# Patient Record
Sex: FEMALE | URBAN - METROPOLITAN AREA
[De-identification: names, ages, dates, MRNs, and addresses within clinical notes are randomized per-mention and may not be internally consistent; named-entity substitution may affect disease eponyms.]

---

## 2023-12-01 ENCOUNTER — TELEPHONE (OUTPATIENT)
Dept: UROLOGY | Facility: CLINIC | Age: 78
End: 2023-12-01

## 2023-12-01 NOTE — TELEPHONE ENCOUNTER
Contacted patient from [facility] to schedule for NP appointment on 12/5. Patient accepted and confirmed new date and time of NP appt. Patient was provided with NP instructions and expressed understanding to bring ID, insurance card(s), and NP paperwork. Patient was also provided with clinic address.

## 2023-12-05 ENCOUNTER — OFFICE VISIT (OUTPATIENT)
Dept: UROLOGY | Facility: CLINIC | Age: 78
End: 2023-12-05
Attending: OBSTETRICS & GYNECOLOGY
Payer: MEDICARE

## 2023-12-05 VITALS
WEIGHT: 135.19 LBS | BODY MASS INDEX: 26.54 KG/M2 | RESPIRATION RATE: 16 BRPM | TEMPERATURE: 98 F | HEIGHT: 60 IN | SYSTOLIC BLOOD PRESSURE: 128 MMHG | DIASTOLIC BLOOD PRESSURE: 58 MMHG

## 2023-12-05 DIAGNOSIS — N39.0 FREQUENT UTI: ICD-10-CM

## 2023-12-05 DIAGNOSIS — N81.2 UTEROVAGINAL PROLAPSE, INCOMPLETE: Primary | ICD-10-CM

## 2023-12-05 DIAGNOSIS — R35.1 NOCTURIA: ICD-10-CM

## 2023-12-05 DIAGNOSIS — N81.84 PELVIC MUSCLE WASTING: ICD-10-CM

## 2023-12-05 DIAGNOSIS — N95.2 POSTMENOPAUSAL ATROPHIC VAGINITIS: ICD-10-CM

## 2023-12-05 LAB
BLOOD URINE: NEGATIVE
CONTROL RUN WITHIN 24 HOURS?: YES
LEUKOCYTE ESTERASE URINE: NEGATIVE
NITRITE URINE: NEGATIVE

## 2023-12-05 PROCEDURE — 81002 URINALYSIS NONAUTO W/O SCOPE: CPT | Performed by: OBSTETRICS & GYNECOLOGY

## 2023-12-05 PROCEDURE — 87086 URINE CULTURE/COLONY COUNT: CPT | Performed by: OBSTETRICS & GYNECOLOGY

## 2023-12-05 PROCEDURE — 99212 OFFICE O/P EST SF 10 MIN: CPT

## 2023-12-05 PROCEDURE — 51701 INSERT BLADDER CATHETER: CPT | Performed by: OBSTETRICS & GYNECOLOGY

## 2023-12-05 RX ORDER — ESTRADIOL 0.1 MG/G
CREAM VAGINAL
Qty: 42.5 G | Refills: 3 | Status: SHIPPED | OUTPATIENT
Start: 2023-12-05

## 2023-12-08 ENCOUNTER — TELEPHONE (OUTPATIENT)
Dept: UROLOGY | Facility: CLINIC | Age: 78
End: 2023-12-08

## 2023-12-08 NOTE — TELEPHONE ENCOUNTER
TC from patient on nurse line to clarify use of estrace vaginal cream is 2x/week not 2x/day    Called back and LVM to use 2x/week

## 2023-12-12 ENCOUNTER — TELEPHONE (OUTPATIENT)
Dept: UROLOGY | Facility: CLINIC | Age: 78
End: 2023-12-12

## 2023-12-13 ENCOUNTER — OFFICE VISIT (OUTPATIENT)
Dept: UROLOGY | Facility: CLINIC | Age: 78
End: 2023-12-13
Attending: OBSTETRICS & GYNECOLOGY
Payer: MEDICARE

## 2023-12-13 VITALS
WEIGHT: 135 LBS | SYSTOLIC BLOOD PRESSURE: 124 MMHG | BODY MASS INDEX: 26 KG/M2 | RESPIRATION RATE: 16 BRPM | DIASTOLIC BLOOD PRESSURE: 72 MMHG

## 2023-12-13 DIAGNOSIS — N81.2 UTEROVAGINAL PROLAPSE, INCOMPLETE: Primary | ICD-10-CM

## 2023-12-13 LAB
CONTROL RUN WITHIN 24 HOURS?: YES
LEUKOCYTE ESTERASE URINE: NEGATIVE
NITRITE URINE: NEGATIVE

## 2023-12-13 PROCEDURE — 51797 INTRAABDOMINAL PRESSURE TEST: CPT

## 2023-12-13 PROCEDURE — 81002 URINALYSIS NONAUTO W/O SCOPE: CPT

## 2023-12-13 PROCEDURE — 51741 ELECTRO-UROFLOWMETRY FIRST: CPT

## 2023-12-13 PROCEDURE — 51729 CYSTOMETROGRAM W/VP&UP: CPT

## 2023-12-13 PROCEDURE — 51784 ANAL/URINARY MUSCLE STUDY: CPT

## 2023-12-13 RX ORDER — RAMIPRIL 5 MG/1
5 CAPSULE ORAL DAILY
COMMUNITY

## 2023-12-13 NOTE — PROCEDURES
Patient here for urodynamic testing. Procedure explained and confirmed by patient. See evaluation form for results. Both verbal and written discharge instructions were given. Patient tolerated procedure well and will follow up with Dr. Dyana Weaver on 2024. URODYNAMIC EVALUATION    PATIENT HISTORY:    Prolapse:  Yes ( does not have sense of bulge)  NATHAN:  No  UUI:  No  Nocturia:  2 to 3 - varies  Frequency:  >3 hours  Sense of Incomplete Emptying:  No  Constipation:  No  Last void prior to UDS testin minutes  Current urge to void? Moderate  OAB meds stopped prior to test?  NA  Other symptoms? Reports pressure in lower pelvis at times - hx of UTI symptoms  [facility]ed [facility] following visit on 23 - reviewed finger tip application    Surgery? [x]  No  []  Interested in surgery:   []  Yes, specify date:    Surgical folder provided? []  Yes  [x]  No     PATIENT DIAGNOSIS:  Uterovaginal Prolapse N81.2 (incomplete)    UDS PROCEDURAL FINDINGS:  Urethral Hypermobility N36.41    MEDICATION:   Outpatient Medications Marked as Taking for the 23 encounter (Office Visit) with LIS PROCEDURE   Medication Sig Dispense Refill    Stress Formula/Zinc Oral Tab Take 1 tablet by mouth daily. ramipril 5 MG Oral Cap Take 1 capsule (5 mg total) by mouth daily. estradiol (ESTRACE) 0.1 MG/GM Vaginal Cream Apply 1/2 gram vaginally 2 nights a week 42.5 g 3    CRANBERRY OR Take by mouth. ALLERGIES:  Patient has no known allergies. EXAM:  Urinalysis Dip:  Today's Results   Component Date Value    control run 2023 Yes     Blood Urine 2023 Trace (A)     Nitrite Urine 2023 Negative     Leukocyte esterase urine 2023 Negative       Urovesico [place]unction ( >30 degrees ):  [x]  Mobile  []  Fixed    Perineal Sensation:  [x]  Normal  []  Abnormal    Additional Notes: Does Kegels on regular basis, although is unsure if she completely relaxes contraction.     PROLAPSE:  [x]  Yes []  No  Prolapse reduced for testing? [x]  Yes  []  No  []  Pessary  []  Manual  [x]  Half Speculum    Additional Notes:    UROFLOWMETRY:  Unreduced  Voided Volume:                    219         mL  Maximum Flow Rate:                        14        mL/sec  Average flow rate:                    7         mL/sec  Post-void Residual:                    40       mL  Pattern:  [x]  Normal  []  Poor flow     []  Intermittent  []  Other  Void:   [x]  Typical  []  Atypical    Additional Notes:    CYSTOMETRY:  Urethral Catheter:  Fr 7 / tdoc  Abd Catheter:     Fr 7 / tdoc   Infusion:  Water Rate 30 mL/min  Temp:  Room  Position:  [x]  Sit  []  Stand  []  Supine  First sensation:   121 mL  First desire to void:   200 mL  Strong desire to void:  276 mL  Maximum cystometric capacity:   300 mL  Detrusor Activity:  []  Unstable   [x]  Stable  Urge leakage? []  Yes [x]  No  Volume at 1st unhibited detrusor cont:    mL  Detrusor instability provoked by:    []  Spontaneous []  Coughing  []  Filling  []  Valsalva  []  Other    Additional Notes:      URETHRAL FUNCTION:  Valsava (vesical) Leak Point Pressures:    Volume Leak Point Pressure Leak? Cough Valsalva      100mL 77 44    cm H2O []  Yes [x]  No         REDUCED: ( half spec)  Volume Leak Point Pressure Leak? Cough Valsalva      100mL 77 43    cm H2O []  Yes [x]  No   200mL 88 79    cm H2O []  Yes [x]  No   275 mL 104 65    cm H2O []  Yes [x]  No       Genuine Stress Incontinence demonstrated?    []  Yes  [x]  No    Resting Urethral Pressure Profile:     Functional Urethral Length:         0.6 cm         0.6        cm                 cm  Maximum UCP:           63 cm             74 cm                  cm    PRESSURE/FLOW STUDY:  Unreduced  Voided volume:      423 + 50 ml BR = 473   mL  Maximum flow rate:      19  mL/sec     Pressure Detrusor (at maximum flow):     13 ( pves removed)       cm H2O  Post void residual:     22 ( bladder scan)          mL  Voiding mechanism:  []  Abnormal  [x]  Normal  []  Strain to void   []  Weak detrusor  Void:   []  Typical   [x]  Atypical    Additional Notes: Unable to empty with Pves in place - volume of 423 ml and up to BR for additional 50 ml only   Uroflowmetry, cystometry, and pressure / flow study were completed using calibrated electronic equipment and air charged transducers.     EMG:  During fill: Reactive    During flow study: Reactive    12/13/2023 11:44 AM     PERFORMED BY:  Colin Rodriguez RN      URODYNAMIC PHYSICIAN INTERPRETATION    IMPRESSION:   219/40cc & 473/22cc  FDC 300cc  No DO  No NATHAN  Post-Procedure Diagnoses: Urethral hypermobility [N36.41]    Comments:      Nichol Trujillo,    12/13/2023   3:28 PM

## 2023-12-13 NOTE — PATIENT INSTRUCTIONS
[facility] UROGYNECOLOGY  [address]   [place] [address]  [place]: [phone]  FAX: [phone]       Urodynamic Testing Discharge Instructions: There are NO dietary or activity restrictions. You may resume your normal schedule. You may have mild discomfort for a few hours after your testing today. There may be some mild burning when you urinate or you may see some blood in your urine. These problems should not last more than 24 hours. The following suggestions may minimize any symptoms you experience. Drink 6-8 large glasses of water over the next 8 hours  A compress or sitz bath may be soothing  Tylenol or Ibuprofen may be taken as needed    If you experience any of the following, please call the office or, if after hours, the on-call physician at [phone]. Excessive pain  Bright red bloody discharge  Fever or chills  Continued urgency, frequency or burning with urination    Obtaining Test Results    Your urodynamic test will be interpreted by a specialist and available to the referring physician within 7-14 days. Patients in our clinic are given an appointment to come back to discuss the results and any appropriate treatment recommendations. Please do not hesitate to contact our office with any questions or concerns at [phone]. I acknowledge that I have received verbal and written discharge  instructions and that I understand these instructions clearly.     Patient Signature:    Date:

## 2024-01-09 ENCOUNTER — VIRTUAL PHONE E/M (OUTPATIENT)
Dept: UROLOGY | Facility: CLINIC | Age: 79
End: 2024-01-09
Attending: OBSTETRICS & GYNECOLOGY
Payer: MEDICARE

## 2024-01-09 ENCOUNTER — TELEPHONE (OUTPATIENT)
Dept: UROLOGY | Facility: CLINIC | Age: 79
End: 2024-01-09

## 2024-01-09 DIAGNOSIS — N81.2 UTEROVAGINAL PROLAPSE, INCOMPLETE: Primary | ICD-10-CM

## 2024-01-09 DIAGNOSIS — N81.84 PELVIC MUSCLE WASTING: ICD-10-CM

## 2024-01-09 DIAGNOSIS — R35.1 NOCTURIA: ICD-10-CM

## 2024-01-09 DIAGNOSIS — N95.2 POSTMENOPAUSAL ATROPHIC VAGINITIS: ICD-10-CM

## 2024-01-09 NOTE — PROGRESS NOTES
Pt presents w/ initial c/o pressure with urgency & urinary urgency   Urodynamic testing undergone without complication.  Results reviewed with patient via telephone  Given circumstances surrounding COVID-19 this visit is being conducted as a televisit with pt's consent.  Pt in safe, private environment for televisit, provider located in the office setting    219/40cc & 473/22cc  MCFP 300cc  No DO  No NATHAN    Discussed with patient mgmt options for bladder sx  Biggest bother is pressure with urgency & urinary urgency   Reports discomfort about once per week, no clear triggers  Self limited  Pressure or tightness  Worse w/ stress  Denies bulge sx  Vag estrogen twice weekly  No recent UTIs    Discussed dietary and behavioral modifications for mgmt of urinary symptoms  Discussed weight management and benefits of weight loss on urinary symptoms  Reviewed AUA/SUFU guidelines on mgmt of non-neurogenic OAB  Discussed pharmacologic and nonpharmacologic mgmt options of urinary symptoms - reviewed risks, benefits, alternatives, and goals of treatment  Discussed specific risks related to OAB meds including, but not limited to dry mouth, constipation, blurry vision, cognitive changes, and BP elevation.      Thorough discussion of surgical risks, benefits, and alternatives including, but not limited to bleeding/clots, infection, injury to nearby organs (urethra, bladder, ureters, bowel, blood vessels), mesh erosion, dyspareunia, de chance UUI, worsening NATHAN, recurrence, voiding dysfunction, and pain.    Discussed mgmt of vulvovaginal atrophy with vaginal estrogen cream. Reviewed associated benefits, risks, alternatives, and goals. Recommend low dose twice weekly mgmt   Cont vag estrogen twice weekly    All questions answered.  Pt will proceed PFPT, cont vag estrogen  Call with s/sx of UTI    Follow up after PFPT    Dr. Crystal Wallace

## 2024-01-09 NOTE — PATIENT INSTRUCTIONS
Name Address City Phone Contact   [facility] [address] [place] [phone]    [facility] [address] [place] [phone] [facility] [address] [place] [phone]    [facility] [address] [facility] [address] [place] [phone] Kadeem Macias   [facility] 2151 [facility] [phone] Lorena Mago [facility] [address] [facility] [phone]    [facility] 500 [facility] [phone] Shireen Mayorga    [facility] 5 [facility] [phone] [facility] 2[address] [facility] [phone] Qian Mcclain   Rehabilitation Services at [facility] [address] [facility] [address] [place] [phone] Erica [facility] 220 [facility] [address] [place] [phone]    [facility] [address] [facility] [address] [place] [phone] Velia Adams   [facility] 1455 [facility] [phone] Kathryn Cerna   [facility] [address] [phone] Lorena Murillo   [facility] 7117 [facility] [phone] [facility] 676 [facility] 950 [place] [phone]    [facility] 4001 [facility] [address] [place] [phone] Easton SANTACR[facility] Physical Therapy,  [address] [facility] [address] [place] [phone] Jeremy Chu   [facility] [address] [phone] Dayna Harrison   Rehabilitation Services at [facility] 200 [facility] [phone] Rashmi Davis    [facility] 400 [facility] [phone]  Yanira Bird   [facility] [address] [place] [address] [phone] Hollis Sanjay   [facility] [address] [place] [phone] Joellen Al   [facility] 1200 [facility] [phone] Kimmie [facility] 536 [facility] [address] [place] [phone]    [facility] 429 [facility] [phone] [facility] [place]   [facility] [address] [phone] Coral Mckeon   [facility] 528 [facility] [phone] [address] [phone] Mary Foy   Rehabilitation Services at [facility] [address] [place] [phone] Rajni [facility] 854 [facility] [address] Darwin Schumacher [phone]    [facility] [address] Darwin Schumacher [phone] Alma Rodríguez   [facility]habilitation [facility] at [facility] [address] [phone] Maggie Kim   [facility] [address] [place] [address] [address] [place] [phone]    [facility] Therapy [address] Venkat Granados [phone] Dayna [facility] 40 [facility] [address] [phone]    [facility] [address] [facility] [phone] Joellen Al   [facility] 3080 [facility] [phone] Malu [facility] [address] [place] [phone]    [facility] Services [address] [facility] [address] [place] [phone] Malu Jiménez   [facility] [address] [place]  [place] [phone] Vicky Thomson   [facility] [address] [facility] [phone]    [facility] 430 [facility]. [address] [place] [phone]    [facility] Physical Therapy [place] 1019 [facility] [phone] Griselda Yady Michaud [facility] [address] [place] [phone] Mindy Michaud   [facility] [address] [place] [phone] [facility] Physical Therapy [facility] [address] E [facility] [phone] Camelia Aguila   [facility] 2940 [facility] [address] [place] [phone] Thi Lynn   [facility] Therapy 640 [facility] [address] [place] [phone]    [facility] Therapy [address] [place] [phone]    [facility] [place] [address] [place] [phone]    [facility]: [place] [address] [facility] Abreu [phone]    Rehabilitation Services at [facility] 636 [facility] [address] [place] [phone] [facility] 1 [facility] [address] [place] [phone]    [facility] [address] [place] [phone] Jenni Alejandre   [facility] Therapy [address] [place] [phone] Critical access hospital Physical Therapy [address] [place] [phone] [facility] 1875 [facility] [place] [address] [place] [phone]    [facility] [address]. [facility] [phone] Monica Martinez Physical Therapy [facility] 828 [facility] [phone]    [facility] 310 [facility] [phone] Savanna Angel   [address] [phone] Jenni [facility] Physical Therapy [address] [place] [phone]    [facility] [place] [address]nd Floor [place] [phone]    [facility] 7339 [facility] [phone] [facility] Physical Therapy [facility] 301 E [facility] [phone] Sejal [facility] 102 [facility] [address] [place] [phone]    Rehabilitation Services at [facility] 544 [facility] [phone] El[facility]   Rehabilitation Services at [facility] 2900 [facility] [phone]  Iwona Wheeler   [facility] 1820 [facility] [phone] Pat Jiang   [facility] 710 [facility] [phone] Licha Escoto   [facility] 330 [facility] [phone] Verenice Landaverde   [facility] [place] 414 Division [facility] [phone] Mariya Marina   [facility] [place] 2111 [facility] [place] [phone] Deneen Antonio   [facility]'s [facility] [place] 35759 [facility] [phone] Jacqueline [facility] 30316 [facility] [phone]    [facility] [address] [facility] [phone]    [facility] [address] [place] [phone]    [facility] [address] [place] [phone] Sumaya Vero [facility] [address] [place] [phone] Jigna [facility] [address] [place] [phone]    [facility] Therapy 7 [facility] [address] [place] [phone] Adriana Portillo   [facility] [address]  Sel[address] [place] [phone] Adriana RojasLake [facility] & [facility] [place] [address] [place] [address] [phone]

## 2025-03-11 ENCOUNTER — TELEPHONE (OUTPATIENT)
Dept: UROLOGY | Facility: CLINIC | Age: 80
End: 2025-03-11

## 2025-03-21 ENCOUNTER — OFFICE VISIT (OUTPATIENT)
Dept: UROLOGY | Facility: CLINIC | Age: 80
End: 2025-03-21
Attending: OBSTETRICS & GYNECOLOGY
Payer: MEDICARE

## 2025-03-21 VITALS — WEIGHT: 135 LBS | TEMPERATURE: 98 F | BODY MASS INDEX: 26.5 KG/M2 | RESPIRATION RATE: 18 BRPM | HEIGHT: 60 IN

## 2025-03-21 DIAGNOSIS — N81.2 UTEROVAGINAL PROLAPSE, INCOMPLETE: Primary | ICD-10-CM

## 2025-03-21 DIAGNOSIS — N95.2 POSTMENOPAUSAL ATROPHIC VAGINITIS: ICD-10-CM

## 2025-03-21 DIAGNOSIS — N81.84 PELVIC MUSCLE WASTING: ICD-10-CM

## 2025-03-21 DIAGNOSIS — R39.15 URINARY URGENCY: ICD-10-CM

## 2025-03-21 PROCEDURE — 99212 OFFICE O/P EST SF 10 MIN: CPT

## 2025-03-21 RX ORDER — HYDROCHLOROTHIAZIDE 12.5 MG/1
12.5 CAPSULE ORAL
COMMUNITY
Start: 2024-06-06

## 2025-03-21 NOTE — PROGRESS NOTES
Patient presents to follow up urinary urgency  Last seen 1/24    She is currently using PFPT  Vag estrogen twice weekly    She reports 100% improvement of urinary urgency    1/24 uds  219/40cc & 473/22cc  CHCF 300cc  No DO  No NATHAN    Bowels reg  No UTIs  Denies s/sx of UTI    Reports bulge sx  Considering pessary    Temp 97.9 °F (36.6 °C)   Resp 18   Ht 60\"   BMI 26.37 kg/m²     GEN: NAD  CV: RRR  Pulm: nl effort  Abd: soft    PELVIC EXAM:  Ext. Gen: +atrophy, no lesions  Urethra: +atrophy, nontender  Bladder:+fullness, nontender  Vagina: +atrophy  Cervix: no bleeding, no lesions, nontender  Uterus: +mobile  Adnexa:no masses, nontender  Perineum: nontender  Anus: wnl  Rectum: defer     PELVIS FLOOR NEUROMUSCULAR FUNCTION:  Strength:  1 and Unable to hold greater than 3 sec  Perineal Sensation:  Normal        PELVIC SUPPORT:  [place]:  2  Ant:  3  Post:  2  CST:  negative  UVJ: +hypermobile    Impression/Plan:    ICD-10-CM    1. Uterovaginal prolapse, incomplete  N81.2       2. Postmenopausal atrophic vaginitis  N95.2       3. Pelvic muscle wasting  N81.84       4. Urinary urgency  R39.15           Discussion Items:   Nonsurgical and surgical treatments for POP  Pelvic muscle rehabilitation including pelvic floor PT  Topical estrogen therapy for treating UGA    Discussed mgmt of vulvovaginal atrophy with vaginal estrogen cream. Reviewed associated benefits, risks, alternatives, and goals. Recommend low dose twice weekly mgmt     Discussed management of pelvic organ prolapse including but not limited to behavioral modifications, conservative options, and surgical management.   Discussed pessary management including benefits and risks. Discussed importance of keeping regularly scheduled pessary checks in prevention of complications related to pessary use.     All questions answered  She understands and agrees to plan    Return for pessary trial.    Crystal Wallace, DO, FACOG, FACS    The 21st Century Cures Act makes  medical notes like these available to patients in the interest of transparency. However, be advised this is a medical document. It is intended as peer to peer communication. It is written in medical language and may contain abbreviations or verbiage that are unfamiliar. It may appear blunt or direct. Medical documents are intended to carry relevant information, facts as evident, and the clinical opinion of the practitioner.

## 2025-05-01 ENCOUNTER — OFFICE VISIT (OUTPATIENT)
Dept: UROLOGY | Facility: CLINIC | Age: 80
End: 2025-05-01
Attending: OBSTETRICS & GYNECOLOGY
Payer: MEDICARE

## 2025-05-01 VITALS — RESPIRATION RATE: 16 BRPM | TEMPERATURE: 97 F | HEIGHT: 60 IN | BODY MASS INDEX: 26 KG/M2

## 2025-05-01 DIAGNOSIS — N81.84 PELVIC MUSCLE WASTING: ICD-10-CM

## 2025-05-01 DIAGNOSIS — R35.1 NOCTURIA: ICD-10-CM

## 2025-05-01 DIAGNOSIS — N81.2 UTEROVAGINAL PROLAPSE, INCOMPLETE: Primary | ICD-10-CM

## 2025-05-01 DIAGNOSIS — N95.2 POSTMENOPAUSAL ATROPHIC VAGINITIS: ICD-10-CM

## 2025-05-01 PROCEDURE — 57160 INSERT PESSARY/OTHER DEVICE: CPT

## 2025-05-01 PROCEDURE — 99212 OFFICE O/P EST SF 10 MIN: CPT

## 2025-05-01 NOTE — PROGRESS NOTES
Pt here for pessary trial due to bulge  Wants to exhaust conservative options  Denies UI complaints  Denies NATHAN complaints  Denies UTI's  Bowels regular  Pt is using vaginal cream twice weekly   Did PFPT, doing daily pelvic exercises     She is not currently interested in surgical management of her pelvic organ prolapse.  Wants to try home care    1/24 uds  219/40cc & 473/22cc  alf 300cc  No DO  No NATHAN    Vitals:  Vitals:    05/01/25 0917   Resp: 16   Temp: 97 °F (36.1 °C)       GENERAL EXAM:  GENERAL:  NAD  HEAD: NC/AT  EYES: symmetric bilaterally. No icterus. Sclera w/o injection  NECK: no masses  LUNGS:  Normal resp effort  ABDOMEN:  Soft, no mass  MUSK: normal gait, ROM wnl. No edema  PSYCH: A&Ox3. Recent and remote memory intact    PELVIC EXAM: JENNA Swan, present for exam as a chaperone  Ext. Gen: +atrophy, no lesions  Urethra: +atrophy, nontender  Bladder:+fullness, nontender  Vagina: +atrophy  Cervix: no bleeding, no lesions, nontender  Uterus: +mobile  Adnexa:no masses, nontender  Perineum: nontender  Anus: wnl  Rectum: defer     PELVIS FLOOR NEUROMUSCULAR FUNCTION:  Strength:  1 and Unable to hold greater than 3 sec  Perineal Sensation:  Normal        PELVIC SUPPORT:  [place]:  2  Ant:  3  Post:  2      A #3 ring with support pessary was placed without difficulty.  Patient stated it was comfortable and supportive  Able to void freely  Patient taught and able to demonstrate self removal and insertion     Impression/Plan:    ICD-10-CM    1. Uterovaginal prolapse, incomplete  N81.2       2. Postmenopausal atrophic vaginitis  N95.2       3. Pelvic muscle wasting  N81.84       4. Nocturia  R35.1           Discussion Items:   Discussed mgmt of vulvovaginal atrophy with vaginal estrogen cream. Reviewed associated benefits, risks, alternatives, and goals. Recommend low dose twice weekly mgmt   Discussed management of pelvic organ prolapse including but not limited to behavioral modifications, conservative options, and  surgical management.   Discussed pessary management including benefits and risks. Discussed importance of keeping regularly scheduled pessary checks in prevention of complications related to pessary use.     Continue pessary management home care  Continue vaginal estrogen cream twice weekly  Bladder diet/drill  Bowel regimen reviewed  Call with s/sx of UTI, problems with pessary   All questions answered  She understands and agrees to plan      Return in about 4 weeks (around 5/29/2025) for pessary care, sooner prn .      Elba Julian PA-C    The 21st Century Cures Act makes medical notes like these available to patients in the interest of transparency. However, be advised this is a medical document. It is intended as peer to peer communication. It is written in medical language and may contain abbreviations or verbiage that are unfamiliar. It may appear blunt or direct. Medical documents are intended to carry relevant information, facts as evident, and the clinical opinion of the practitioner.

## 2025-05-29 ENCOUNTER — OFFICE VISIT (OUTPATIENT)
Dept: UROLOGY | Facility: CLINIC | Age: 80
End: 2025-05-29
Attending: OBSTETRICS & GYNECOLOGY
Payer: MEDICARE

## 2025-05-29 VITALS — HEIGHT: 60 IN | RESPIRATION RATE: 16 BRPM | TEMPERATURE: 97 F | BODY MASS INDEX: 26 KG/M2

## 2025-05-29 DIAGNOSIS — N81.84 PELVIC MUSCLE WASTING: ICD-10-CM

## 2025-05-29 DIAGNOSIS — N95.2 POSTMENOPAUSAL ATROPHIC VAGINITIS: ICD-10-CM

## 2025-05-29 DIAGNOSIS — R39.15 URINARY URGENCY: ICD-10-CM

## 2025-05-29 DIAGNOSIS — R35.1 NOCTURIA: ICD-10-CM

## 2025-05-29 DIAGNOSIS — N81.2 UTEROVAGINAL PROLAPSE, INCOMPLETE: Primary | ICD-10-CM

## 2025-05-29 PROCEDURE — 99212 OFFICE O/P EST SF 10 MIN: CPT

## 2025-05-29 RX ORDER — LATANOPROST 50 UG/ML
1 SOLUTION/ DROPS OPHTHALMIC DAILY
COMMUNITY
Start: 2025-03-11

## 2025-05-29 NOTE — PROGRESS NOTES
Pt presents to follow up bulge  Doing well with #3 ring with support pessary, home care  Reports pessary is comfortable   Denies discharge  Denies bleeding  Denies s/sx of UTI  Bowels regular   Pt is removing pessary at home every day  Pt is using vaginal estrogen cream twice weekly  Going to [facility], interested in seeing other PT, doesn't feel significant improvement in symptoms     Happy with pessary, feels supported  She is not currently interested in surgical management of her pelvic organ prolapse.    1/24 uds  219/40cc & 473/22cc  intermediate 300cc  No DO  No NATHAN    Urogynecology Summary:  Urogynecology Summary  Prolapse: Yes  NATHAN: No  Urge Incontinence: No  Nocturia Frequency: 1  Frequency: 2 - 3 hours  Incomplete emptying: No  Constipation: No  Wears pad day?: 0  Wears Pad Night?: 0  Activities are limited by UI/POP?: Yes (bulge)  Currently Sexually Active: No      Vitals:  Vitals:    05/29/25 0853   Resp: 16   Temp: 97 °F (36.1 °C)       GENERAL EXAM:  GENERAL:  NAD  HEAD: NC/AT  EYES: symmetric bilaterally. No icterus. Sclera w/o injection  NECK: no masses  LUNGS:  Normal resp effort  ABDOMEN:  Soft, no mass  MUSK: normal gait, ROM wnl. No edema  PSYCH: A&Ox3. Recent and remote memory intact    PELVIC EXAM: JENNA Swan, present for exam as a chaperone  Ext. Gen: +atrophy, no lesions  Urethra: +atrophy, nontender  Bladder:+fullness, nontender  Vagina: +atrophy  Cervix: no bleeding, no lesions, nontender  Uterus: +mobile  Adnexa:no masses, nontender  Perineum: nontender  Anus: wnl  Rectum: defer     PELVIS FLOOR NEUROMUSCULAR FUNCTION:  Strength:  1 and Unable to hold greater than 3 sec  Perineal Sensation:  Normal        PELVIC SUPPORT:  [place]:  2  Ant:  3  Post:  2    Her pessary was removed, cleaned, and reinserted w/o difficulty    Impression/Plan:    ICD-10-CM    1. Uterovaginal prolapse, incomplete  N81.2 PHYSICAL THERAPY - External Location      2. Postmenopausal atrophic vaginitis  N95.2       3. Pelvic muscle  wasting  N81.84 PHYSICAL THERAPY - External Location      4. Nocturia  R35.1 PHYSICAL THERAPY - External Location      5. Urinary urgency  R39.15 PHYSICAL THERAPY - External Location            Discussion Items:   Discussed mgmt of vulvovaginal atrophy with vaginal estrogen cream. Reviewed associated benefits, risks, alternatives, and goals. Recommend low dose 2-3x weekly mgmt   Discussed management of pelvic organ prolapse including but not limited to behavioral modifications, conservative options, and surgical management.   Discussed pessary management including benefits and risks. Discussed importance of keeping regularly scheduled pessary checks in prevention of complications related to pessary use.     Continue pessary management, home care  Continue vag estrogen 2-3x weekly  PT order and list given   Daily pelvic exercises  Bowel management reviewed  Call with s/sx of UTI, problems with pessary   All questions answered  Pt understands and agrees to plan       Return in about 3 months (around 8/29/2025) for pessary care, sooner prn .      Elba Julian PA-C      The 21st Century Cures Act makes medical notes like these available to patients in the interest of transparency. However, be advised this is a medical document. It is intended as peer to peer communication. It is written in medical language and may contain abbreviations or verbiage that are unfamiliar. It may appear blunt or direct. Medical documents are intended to carry relevant information, facts as evident, and the clinical opinion of the practitioner.

## 2025-07-24 ENCOUNTER — OFFICE VISIT (OUTPATIENT)
Dept: UROLOGY | Facility: CLINIC | Age: 80
End: 2025-07-24
Attending: OBSTETRICS & GYNECOLOGY
Payer: MEDICARE

## 2025-07-24 VITALS — RESPIRATION RATE: 16 BRPM | TEMPERATURE: 98 F | BODY MASS INDEX: 26 KG/M2 | HEIGHT: 60 IN

## 2025-07-24 DIAGNOSIS — N81.2 UTEROVAGINAL PROLAPSE, INCOMPLETE: Primary | ICD-10-CM

## 2025-07-24 DIAGNOSIS — N95.2 POSTMENOPAUSAL ATROPHIC VAGINITIS: ICD-10-CM

## 2025-07-24 DIAGNOSIS — N81.11 CYSTOCELE, MIDLINE: ICD-10-CM

## 2025-07-24 DIAGNOSIS — N81.84 PELVIC MUSCLE WASTING: ICD-10-CM

## 2025-07-24 PROCEDURE — 99212 OFFICE O/P EST SF 10 MIN: CPT

## 2025-07-24 PROCEDURE — 57160 INSERT PESSARY/OTHER DEVICE: CPT

## 2025-07-24 NOTE — PROGRESS NOTES
Pt presents to follow up bulge  Has #3 ring with support pessary, home care  Reports pessary is comfortable, wondering if pessary is too small   Denies discharge  Denies bleeding  Denies s/sx of UTI  Bowels regular   Pt is removing pessary at home nightly   Pt is using vaginal estrogen cream twice weekly  Has not gone to PFPT    Happy with pessary, feels supported  She is not currently interested in surgical management of her pelvic organ prolapse.    UDS 12/13/23  219/40cc & 473/22cc  skilled nursing 300cc  No DO  No NATHAN    Urogynecology Summary:  Urogynecology Summary  Prolapse: Yes  NATHAN: No  Urge Incontinence: No  Nocturia Frequency: 2 (1 or 2)  Frequency: 2 - 3 hours  Incomplete emptying: No  Constipation: No  Wears pad day?: 0  Wears Pad Night?: 0  Activities are limited by UI/POP?: No  Currently Sexually Active: No      Vitals:  Vitals:    07/24/25 0923   Resp: 16   Temp: 98 °F (36.7 °C)       GENERAL EXAM:  GENERAL:  NAD  HEAD: NC/AT  EYES: symmetric bilaterally. No icterus. Sclera w/o injection  NECK: no masses  LUNGS:  Normal resp effort  ABDOMEN:  Soft, no mass  MUSK: normal gait, ROM wnl. No edema  PSYCH: A&Ox3. Recent and remote memory intact    PELVIC EXAM: JENNA Swan, present for exam as a chaperone  Ext. Gen: +atrophy, no lesions  Urethra: +atrophy, nontender  Bladder:+fullness, nontender  Vagina: +atrophy  Cervix: no bleeding, no lesions, nontender  Uterus: +mobile  Adnexa:no masses, nontender  Perineum: nontender  Anus: wnl  Rectum: defer     PELVIS FLOOR NEUROMUSCULAR FUNCTION:  Strength:  1 and Unable to hold greater than 3 sec  Perineal Sensation:  Normal        PELVIC SUPPORT:  [place]:  2  Ant:  3  Post:  2    Her pessary was removed, cleaned, and given back to patient  #4 ring with support pessary inserted, comfortable  Patient able to void    Impression/Plan:    ICD-10-CM    1. Uterovaginal prolapse, incomplete  N81.2       2. Postmenopausal atrophic vaginitis  N95.2       3. Pelvic muscle wasting  N81.84        4. Cystocele, midline  N81.11             Discussion Items:   Discussed mgmt of vulvovaginal atrophy with vaginal estrogen cream. Reviewed associated benefits, risks, alternatives, and goals. Recommend low dose 2-3x weekly mgmt   Discussed management of pelvic organ prolapse including but not limited to behavioral modifications, conservative options, and surgical management.   Discussed pessary management including benefits and risks. Discussed importance of keeping regularly scheduled pessary checks in prevention of complications related to pessary use.     Continue pessary management, home care  Continue vag estrogen 2-3x weekly  Daily pelvic exercises  Bowel management reviewed  Call with s/sx of UTI, problems with pessary   All questions answered  Pt understands and agrees to plan       Return in about 4 weeks (around 8/21/2025) for pessary care, sooner prn .      Elba Julian PA-C      The 21st Century Cures Act makes medical notes like these available to patients in the interest of transparency. However, be advised this is a medical document. It is intended as peer to peer communication. It is written in medical language and may contain abbreviations or verbiage that are unfamiliar. It may appear blunt or direct. Medical documents are intended to carry relevant information, facts as evident, and the clinical opinion of the practitioner.

## 2025-08-14 ENCOUNTER — OFFICE VISIT (OUTPATIENT)
Dept: UROLOGY | Facility: CLINIC | Age: 80
End: 2025-08-14
Attending: OBSTETRICS & GYNECOLOGY

## 2025-08-14 VITALS — BODY MASS INDEX: 26 KG/M2 | TEMPERATURE: 98 F | RESPIRATION RATE: 16 BRPM | HEIGHT: 60 IN

## 2025-08-14 DIAGNOSIS — N95.2 POSTMENOPAUSAL ATROPHIC VAGINITIS: ICD-10-CM

## 2025-08-14 DIAGNOSIS — N81.2 UTEROVAGINAL PROLAPSE, INCOMPLETE: Primary | ICD-10-CM

## 2025-08-14 DIAGNOSIS — R39.15 URINARY URGENCY: ICD-10-CM

## 2025-08-14 DIAGNOSIS — N81.84 PELVIC MUSCLE WASTING: ICD-10-CM

## 2025-08-14 PROCEDURE — 99212 OFFICE O/P EST SF 10 MIN: CPT
